# Patient Record
(demographics unavailable — no encounter records)

---

## 2024-10-31 NOTE — ASSESSMENT
[FreeTextEntry1] : DM, type 2. Hyperlipidemia. Obesity. Encounter for weight management. Rheumatoid arthritis. Asthma.  Lab. tests today. Will begin Mounjaro - side effects discussed; device demonstrated. Continue all other tx. F/U - 3 months.

## 2024-10-31 NOTE — HISTORY OF PRESENT ILLNESS
[FreeTextEntry1] : 64 y.o. female, not previously seen by me, with a h/o type 2 DM for about 5 yrs. She is currently managed with metformin, Trulicity and Farxiga. She has lost 20 lb during the last year. Most recent HbA1C was 7.8% on 1/11/23. Glucose today is 104 mg/dl. Last ophthalm. evaluation was 1 month ago - reportedly without retinopathy. She is on statin tx for hyperlipidemia.  8/3/23. The patient's condition is stable. She had a week of steroid tx for an exacerbation of RA about 1 month ago. BSs by HBGMng are usually under 140 mg/dl. HbA1C was 6.5% on 4/13/23. She has lost 5 lb. Glucose today is 113 mg/dl. 2/15/24. The patient discontinued Trulicity several months ago. She has gained 9 lb. HbA1C today is 8% (was 6.5% in 8/2023). She reports no hypoglycemia. 7/24/24. The patient is doing well overall except for exacerbations of asthma. She has lost 4 lb. She had d/c Mounjaro because of short supply about 2 months ago. HbA1C today is 6.9%, glucose - 89 mg/dl. 10/31/24. The patient is doing well on metformin 500 mg once a day but is unable to lose weight. Glucose today is 98 mg/dl.

## 2024-10-31 NOTE — REASON FOR VISIT
show [Follow - Up] : a follow-up visit [DM Type 2] : DM Type 2 [Weight Management/Obesity] : weight management/obesity [Other___] : [unfilled]

## 2024-12-03 NOTE — ASSESSMENT
[FreeTextEntry1] : 66 year old woman presents for follow up of seropositive RA (strongly positive RF and CCP >250) non erosive disease. Patient overall feeling well in terms of joint symptoms, continues Rasuvo 20 mg qweekly, folic acid 1 mg qday, and hydroxychloroquine 200 mg daily. Patient with right first toe pain with hallux rigidus of the right first toe MTP,, discussed hallux rigidus, possible podiatry evaluation, patient defers at this time. Continues Ophthalmology follow up as recommended.  Labs reviewed from 10/31/2024 with endocrinologist. Will continue current regimen, blood pressure elevated today, repeat improved. Will follow-up in 3 months or sooner as needed.

## 2024-12-03 NOTE — HISTORY OF PRESENT ILLNESS
[FreeTextEntry1] : December 3, 2024 Patient overall feeling well Feels some pain in the right first toe, no swelling but with decrease in movement No podiatry follow up for diabetes, A1c 6.5 9/2024 ophthalmology Plaquenil 200 mg daily Rasuvo 20 mg weekly on Thursday Folic acid 1 mg qday Will have Pneumonia vaccine next week Will have shingles at pharmacy Completed flu vaccine Recent labs reviewed  August 7, 2024 Patient returns for follow up visit of RA Patient overall feeling well, blood pressure elevated today Elevated blood pressure feels likely due to stress from recent travel, due to weather flight from [place] was canceled and drove from [place] to [place] returning yesterday. Patient overall feeling well in terms of joint symptoms Reviewed recent labs from endocrinologist on July 24 Patient continues Rasuvo 20 mg weekly, last dose yesterday, in addition to hydroxychloroquine 200 mg daily, as well as folic acid 1 mg daily Patient is up-to-date with ophthalmology follow-up regarding hydroxychloroquine use No joint swelling present No morning stiffness No side effects from medication  May 24, 2024 Patient returns for follow up of RA. Recently placed on Mounjaro for a few months with good response, but currently difficult to refill Taking Hydroxychloroquine 200 mg qday with folic acid and Rasuvo 20 mg qweek.  No active synovitis.  Last followed up with ophthalmologist one month ago.  Patient is overall feeling well, no joint symptoms at this time. Walks often for exercise No joint swelling or stiffness has some chronic shoulder issues, no pain but limitations in certain movements. Was recently evaluated by PMD, reviewed labs on patient's phone portal from April 29, 2024. - 04/29/24 CBC normal, CMP normal, A1c 6.5%, thyroid normal  February 26, 2024 Patient returns for follow up of RA Patient overall doing well in terms of her arthritis symptoms. Reports pain in fingers, predominantly base of the right thumb, small nodule noted, does not interfere with range of motion of the thumb, but is painful. Reports previous nodule in left palm as well previously, resolved with some massaging, no known history of trigger finger in the past. Continues Rasuvo 20 mg qweek on Fridays, hydroxychloroquine 200 mg qday Takes Tylenol as needed for pain, has not needed to recently Patient planning for left TKA, holding off at this time Discussed using thumb splint for possible early trigger finger, referred for hand orthopedics if does not improve Following with Ophthalmology every 3 months, next appointment March 6th Patient works on computer, will try to limit while thumb symptoms persist. A1c elevated at last visit with endocrinologist, discussed increasing walking as tolerated, Mounjaro prescribed as well.  December 12, 2023 Patient returns for follow up of RA Reports some aches and pain in hands Reports previous difficulty bending finger on left middle finger Recently took Celebrex and Tylenol after pain in right shoulder and pain in finger, felt improvement Started taking hydroxychloroquine 200 mg qday again, no side effects noted, feels improvement Patient forgot to to take Rasuvo for about three weeks, has been taking for the past two weeks on Fridays Planning for left TKA, history of TKA on right knee Evaluated by PCP today, prescribed 10 day course of amoxicillin for sinus infection, will hold Rasuvo  Discussed shingles vaccine Discussed following up with ophthalmology for retinal monitoring due to hydroxychloroquine use  September 12, 2023 Follow up of RA Rasuvo 20 mg qweek, takes on Friday No pain at present Back is better, neck is better Patient will have shingles vaccine, and will have flu vaccine from PMD More walking for exercise Patient was unable to tolerate plaquenil, stopped since last visit, felt dizziness No acetmainophen or nsaids for painrecnetly  June 30, 2023 Patient returns for follow up of RA Had flare of back pain, a couple of months ago Was seen in the ER, received an injection for pain Patient had additional flare of neck pain, treated with medrol dose pack with good response Taking rasuvo 20 mg qweek, takes on Fridays No pain at present If pain is bad, takes two tylenol Feeling better, now can walk better Parked a couple of blocks away, to increase walking A1c 6.5% left shoulder with decreased ROM right knee s/p TKR Left knee feeling better, but left hip feels some pain since change in gait after knee surgery    February 2, 2023 Patient feeling well Walking for exercise No joint symptoms at this time Major concern related to back pain, following with spine, will see Dr. Lozoya, pain is limiting some motion, particularly lateral rotation of the spine, now feels more symptomatic and will see Spine Continues rasuvo 20 on Wednesday or Thursday folic acid  1 mg qday as well Left knee with some pain intermittently, follows with orthopedist, eventually may need left knee replacement, would like to address back issues first Feels some limitation in ROM of the shoulders, patient feels secondary to neck issues Ate within one hour, may have elevated glucose Following closely with endocrinology, restarted trulicity, had diarrhea from metformin so stopped  November 3, 2022 Patient returns for follow up Previously followed by Dr. Oliva  Patient currently takes rasuvo 20 mg for the past two months Much improved since starting rasuvo More walking since right knee replacement February 2022 Recommended to have left knee surgery as well No injections for the left knee previously  Blood pressure elevated, not taking losartan at present, repeat blood pressure better controlled No other joint symptoms at this time Reviewed previous lab results with patient. Will repeat today No recent infections  Will have flu vaccine today.  Patient diagnosed in 1993 Treated with monotherapy with MTX, no other DMARDs in past Changed to injectable MTX about tow months ago. Patient concerned about inflammatory markers

## 2024-12-03 NOTE — REVIEW OF SYSTEMS
[Arthralgias] : arthralgias [Negative] : Heme/Lymph [FreeTextEntry9] : right 1st toe with decrease in flexion and extension

## 2024-12-03 NOTE — PHYSICAL EXAM
[General Appearance - Alert] : alert [General Appearance - In No Acute Distress] : in no acute distress [General Appearance - Well-Appearing] : healthy appearing [Sclera] : the sclera and conjunctiva were normal [Respiration, Rhythm And Depth] : normal respiratory rhythm and effort [Exaggerated Use Of Accessory Muscles For Inspiration] : no accessory muscle use [Auscultation Breath Sounds / Voice Sounds] : lungs were clear to auscultation bilaterally [Abnormal Walk] : normal gait [Musculoskeletal - Swelling] : no joint swelling seen [] : no rash [Oriented To Time, Place, And Person] : oriented to person, place, and time [Impaired Insight] : insight and judgment were intact [Affect] : the affect was normal [Mood] : the mood was normal [Examination Of The Oral Cavity] : the lips and gums were normal [Heart Rate And Rhythm] : heart rate was normal and rhythm regular [Heart Sounds] : normal S1 and S2 [Murmurs] : no murmurs [FreeTextEntry1] : No active synovitis of the upper and lower extremities bilaterally. decrease flexion nd extension of the right 1st MTP

## 2025-03-04 NOTE — PHYSICAL EXAM
[General Appearance - Alert] : alert [General Appearance - In No Acute Distress] : in no acute distress [General Appearance - Well-Appearing] : healthy appearing [Sclera] : the sclera and conjunctiva were normal [Examination Of The Oral Cavity] : the lips and gums were normal [Respiration, Rhythm And Depth] : normal respiratory rhythm and effort [Exaggerated Use Of Accessory Muscles For Inspiration] : no accessory muscle use [Auscultation Breath Sounds / Voice Sounds] : lungs were clear to auscultation bilaterally [Heart Rate And Rhythm] : heart rate was normal and rhythm regular [Heart Sounds] : normal S1 and S2 [Edema] : there was no peripheral edema [No Spinal Tenderness] : no spinal tenderness [Abnormal Walk] : normal gait [Musculoskeletal - Swelling] : no joint swelling seen [] : no rash [Oriented To Time, Place, And Person] : oriented to person, place, and time [Impaired Insight] : insight and judgment were intact [Affect] : the affect was normal [Mood] : the mood was normal [FreeTextEntry1] : No active synovitis of the upper and lower extremities bilaterally. decrease flexion and extension of the right 1st MTP.  Decreased internal and external rotation of the left shoulders, good abduction and adduction of the bilateral shoulders. Right knee s/p TKR, crepitus of the left knee with decrease in full extension, no tenderness, no effusion.

## 2025-03-04 NOTE — HISTORY OF PRESENT ILLNESS
[FreeTextEntry1] : March 4, 2025 Patient returns for follow up of RA Patient overall feeling well Continues Plaquenil 200 mg daily and Rasuvo 20 mg weekly on Thursday Folic acid 1 mg qday Had small flare up with left shoulder pain, after holding medication during infection and taking antibiotics, held for 3 weeks as also forgot the dose, now better since restarting  Plaquenil 200 mg qday Ophthalmology follow up 9/2024 Had appointment with PMD earlier today, will forward lab results to office No side effects to date 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  December 3, 2024 Patient overall feeling well Feels some pain in the right first toe, no swelling but with decrease in movement No podiatry follow up for diabetes, A1c 6.5 9/2024 ophthalmology Plaquenil 200 mg daily Rasuvo 20 mg weekly on Thursday Folic acid 1 mg qday Will have Pneumonia vaccine next week Will have shingles at pharmacy Completed flu vaccine Recent labs reviewed  August 7, 2024 Patient returns for follow up visit of RA Patient overall feeling well, blood pressure elevated today Elevated blood pressure feels likely due to stress from recent travel, due to weather flight from [place] was canceled and drove from [place] to [place] returning yesterday. Patient overall feeling well in terms of joint symptoms Reviewed recent labs from endocrinologist on July 24 Patient continues Rasuvo 20 mg weekly, last dose yesterday, in addition to hydroxychloroquine 200 mg daily, as well as folic acid 1 mg daily Patient is up-to-date with ophthalmology follow-up regarding hydroxychloroquine use No joint swelling present No morning stiffness No side effects from medication  May 24, 2024 Patient returns for follow up of RA. Recently placed on Mounjaro for a few months with good response, but currently difficult to refill Taking Hydroxychloroquine 200 mg qday with folic acid and Rasuvo 20 mg qweek.  No active synovitis.  Last followed up with ophthalmologist one month ago.  Patient is overall feeling well, no joint symptoms at this time. Walks often for exercise No joint swelling or stiffness has some chronic shoulder issues, no pain but limitations in certain movements. Was recently evaluated by PMD, reviewed labs on patient's phone portal from April 29, 2024. - 04/29/24 CBC normal, CMP normal, A1c 6.5%, thyroid normal  February 26, 2024 Patient returns for follow up of RA Patient overall doing well in terms of her arthritis symptoms. Reports pain in fingers, predominantly base of the right thumb, small nodule noted, does not interfere with range of motion of the thumb, but is painful. Reports previous nodule in left palm as well previously, resolved with some massaging, no known history of trigger finger in the past. Continues Rasuvo 20 mg qweek on Fridays, hydroxychloroquine 200 mg qday Takes Tylenol as needed for pain, has not needed to recently Patient planning for left TKA, holding off at this time Discussed using thumb splint for possible early trigger finger, referred for hand orthopedics if does not improve Following with Ophthalmology every 3 months, next appointment March 6th Patient works on computer, will try to limit while thumb symptoms persist. A1c elevated at last visit with endocrinologist, discussed increasing walking as tolerated, Mounjaro prescribed as well.  December 12, 2023 Patient returns for follow up of RA Reports some aches and pain in hands Reports previous difficulty bending finger on left middle finger Recently took Celebrex and Tylenol after pain in right shoulder and pain in finger, felt improvement Started taking hydroxychloroquine 200 mg qday again, no side effects noted, feels improvement Patient forgot to to take Rasuvo for about three weeks, has been taking for the past two weeks on Fridays Planning for left TKA, history of TKA on right knee Evaluated by PCP today, prescribed 10 day course of amoxicillin for sinus infection, will hold Rasuvo  Discussed shingles vaccine Discussed following up with ophthalmology for retinal monitoring due to hydroxychloroquine use  September 12, 2023 Follow up of RA Rasuvo 20 mg qweek, takes on Friday No pain at present Back is better, neck is better Patient will have shingles vaccine, and will have flu vaccine from PMD More walking for exercise Patient was unable to tolerate plaquenil, stopped since last visit, felt dizziness No acetmainophen or nsaids for painrecnetly  June 30, 2023 Patient returns for follow up of RA Had flare of back pain, a couple of months ago Was seen in the ER, received an injection for pain Patient had additional flare of neck pain, treated with medrol dose pack with good response Taking rasuvo 20 mg qweek, takes on Fridays No pain at present If pain is bad, takes two tylenol Feeling better, now can walk better Parked a couple of blocks away, to increase walking A1c 6.5% left shoulder with decreased ROM right knee s/p TKR Left knee feeling better, but left hip feels some pain since change in gait after knee surgery    February 2, 2023 Patient feeling well Walking for exercise No joint symptoms at this time Major concern related to back pain, following with spine, will see Dr. Lozoya, pain is limiting some motion, particularly lateral rotation of the spine, now feels more symptomatic and will see Spine Continues rasuvo 20 on Wednesday or Thursday folic acid  1 mg qday as well Left knee with some pain intermittently, follows with orthopedist, eventually may need left knee replacement, would like to address back issues first Feels some limitation in ROM of the shoulders, patient feels secondary to neck issues Ate within one hour, may have elevated glucose Following closely with endocrinology, restarted trulicity, had diarrhea from metformin so stopped  November 3, 2022 Patient returns for follow up Previously followed by Dr. Oliva  Patient currently takes rasuvo 20 mg for the past two months Much improved since starting rasuvo More walking since right knee replacement February 2022 Recommended to have left knee surgery as well No injections for the left knee previously  Blood pressure elevated, not taking losartan at present, repeat blood pressure better controlled No other joint symptoms at this time Reviewed previous lab results with patient. Will repeat today No recent infections  Will have flu vaccine today.  Patient diagnosed in 1993 Treated with monotherapy with MTX, no other DMARDs in past Changed to injectable MTX about tow months ago. Patient concerned about inflammatory markers

## 2025-03-04 NOTE — ASSESSMENT
[FreeTextEntry1] : 66 year old woman presents for follow up of seropositive RA (strongly positive RF and CCP >250) non erosive disease. Patient overall feeling well in terms of joint symptoms, continues Rasuvo 20 mg qweekly, folic acid 1 mg qday, and hydroxychloroquine 200 mg daily. Continues Ophthalmology follow up as recommended, currently up to date.  Had follow up with PMD earlier today, had labs completed, will fax results to office for review.  Patient following with outside orthopedist for left knee DJD (s/p right TKR in past) for possible left knee replacement, recent surgery postponed as was feeling better in terms of pain and mobility, will follow up with ortho as recommended. Will continue current regimen, blood pressure well controlled today. Will follow-up in 3 months or sooner as needed.

## 2025-04-24 NOTE — ASSESSMENT
[FreeTextEntry1] : Type 2 DM Obesity Hyperlipidemia Rheumatoid arthritis Asthma  Will continue current tx Medications refilled F/U - 6 months

## 2025-04-24 NOTE — HISTORY OF PRESENT ILLNESS
[FreeTextEntry1] : 64 y.o. female, not previously seen by me, with a h/o type 2 DM for about 5 yrs. She is currently managed with metformin, Trulicity and Farxiga. She has lost 20 lb during the last year. Most recent HbA1C was 7.8% on 1/11/23. Glucose today is 104 mg/dl. Last ophthalm. evaluation was 1 month ago - reportedly without retinopathy. She is on statin tx for hyperlipidemia.  8/3/23. The patient's condition is stable. She had a week of steroid tx for an exacerbation of RA about 1 month ago. BSs by HBGMng are usually under 140 mg/dl. HbA1C was 6.5% on 4/13/23. She has lost 5 lb. Glucose today is 113 mg/dl. 2/15/24. The patient discontinued Trulicity several months ago. She has gained 9 lb. HbA1C today is 8% (was 6.5% in 8/2023). She reports no hypoglycemia. 7/24/24. The patient is doing well overall except for exacerbations of asthma. She has lost 4 lb. She had d/c Mounjaro because of short supply about 2 months ago. HbA1C today is 6.9%, glucose - 89 mg/dl. 10/31/24. The patient is doing well on metformin 500 mg once a day but is unable to lose weight. Glucose today is 98 mg/dl. 4/24/25. The patient is doing well. She has lost 10 lb using lifestyle measures. She continues on metformin 500 mg/day. HbA1C today is 6.4%, glucose - 140 mg/dl.

## 2025-04-24 NOTE — REASON FOR VISIT
[Follow - Up] : a follow-up visit [DM Type 2] : DM Type 2 [Menopause Symptoms] : menopause symptoms [Weight Management/Obesity] : weight management/obesity [Other___] : [unfilled]

## 2025-06-03 NOTE — HISTORY OF PRESENT ILLNESS
[FreeTextEntry1] : Beverley 3, 2025 Patient returns for follow up of RA Feeling well Started juice with beet root, green apple, lemon juice, water Feels lowers cravings and helped to lose 11 pounds Also taking tumeric, lemon, ginger feels helping the low back and joint symptoms Feels helping the left knee, feels improvement in left knee extension and gait Walking more than have walked in years as knee feels better Rasuvo 20 mg qweek with folic acid  Plaquenil 200 mg qday Went to doctor earlier today, had blood tests complete and will forward to office for review Ophthalmology 9/2024, follow up in 9/2025 March 4, 2025 Patient returns for follow up of RA Patient overall feeling well Continues Plaquenil 200 mg daily and Rasuvo 20 mg weekly on Thursday Folic acid 1 mg qday Had small flare up with left shoulder pain, after holding medication during infection and taking antibiotics, held for 3 weeks as also forgot the dose, now better since restarting  Plaquenil 200 mg qday Ophthalmology follow up 9/2024 Had appointment with PMD earlier today, will forward lab results to office No side effects to date 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  December 3, 2024 Patient overall feeling well Feels some pain in the right first toe, no swelling but with decrease in movement No podiatry follow up for diabetes, A1c 6.5 9/2024 ophthalmology Plaquenil 200 mg daily Rasuvo 20 mg weekly on Thursday Folic acid 1 mg qday Will have Pneumonia vaccine next week Will have shingles at pharmacy Completed flu vaccine Recent labs reviewed  August 7, 2024 Patient returns for follow up visit of RA Patient overall feeling well, blood pressure elevated today Elevated blood pressure feels likely due to stress from recent travel, due to weather flight from [place] was canceled and drove from [place] to [place] returning yesterday. Patient overall feeling well in terms of joint symptoms Reviewed recent labs from endocrinologist on July 24 Patient continues Rasuvo 20 mg weekly, last dose yesterday, in addition to hydroxychloroquine 200 mg daily, as well as folic acid 1 mg daily Patient is up-to-date with ophthalmology follow-up regarding hydroxychloroquine use No joint swelling present No morning stiffness No side effects from medication  May 24, 2024 Patient returns for follow up of RA. Recently placed on Mounjaro for a few months with good response, but currently difficult to refill Taking Hydroxychloroquine 200 mg qday with folic acid and Rasuvo 20 mg qweek.  No active synovitis.  Last followed up with ophthalmologist one month ago.  Patient is overall feeling well, no joint symptoms at this time. Walks often for exercise No joint swelling or stiffness has some chronic shoulder issues, no pain but limitations in certain movements. Was recently evaluated by PMD, reviewed labs on patient's phone portal from April 29, 2024. - 04/29/24 CBC normal, CMP normal, A1c 6.5%, thyroid normal  February 26, 2024 Patient returns for follow up of RA Patient overall doing well in terms of her arthritis symptoms. Reports pain in fingers, predominantly base of the right thumb, small nodule noted, does not interfere with range of motion of the thumb, but is painful. Reports previous nodule in left palm as well previously, resolved with some massaging, no known history of trigger finger in the past. Continues Rasuvo 20 mg qweek on Fridays, hydroxychloroquine 200 mg qday Takes Tylenol as needed for pain, has not needed to recently Patient planning for left TKA, holding off at this time Discussed using thumb splint for possible early trigger finger, referred for hand orthopedics if does not improve Following with Ophthalmology every 3 months, next appointment March 6th Patient works on computer, will try to limit while thumb symptoms persist. A1c elevated at last visit with endocrinologist, discussed increasing walking as tolerated, Mounjaro prescribed as well.  December 12, 2023 Patient returns for follow up of RA Reports some aches and pain in hands Reports previous difficulty bending finger on left middle finger Recently took Celebrex and Tylenol after pain in right shoulder and pain in finger, felt improvement Started taking hydroxychloroquine 200 mg qday again, no side effects noted, feels improvement Patient forgot to to take Rasuvo for about three weeks, has been taking for the past two weeks on Fridays Planning for left TKA, history of TKA on right knee Evaluated by PCP today, prescribed 10 day course of amoxicillin for sinus infection, will hold Rasuvo  Discussed shingles vaccine Discussed following up with ophthalmology for retinal monitoring due to hydroxychloroquine use  September 12, 2023 Follow up of RA Rasuvo 20 mg qweek, takes on Friday No pain at present Back is better, neck is better Patient will have shingles vaccine, and will have flu vaccine from PMD More walking for exercise Patient was unable to tolerate plaquenil, stopped since last visit, felt dizziness No acetmainophen or nsaids for painrecnetly  June 30, 2023 Patient returns for follow up of RA Had flare of back pain, a couple of months ago Was seen in the ER, received an injection for pain Patient had additional flare of neck pain, treated with medrol dose pack with good response Taking rasuvo 20 mg qweek, takes on Fridays No pain at present If pain is bad, takes two tylenol Feeling better, now can walk better Parked a couple of blocks away, to increase walking A1c 6.5% left shoulder with decreased ROM right knee s/p TKR Left knee feeling better, but left hip feels some pain since change in gait after knee surgery    February 2, 2023 Patient feeling well Walking for exercise No joint symptoms at this time Major concern related to back pain, following with spine, will see Dr. Lozoya, pain is limiting some motion, particularly lateral rotation of the spine, now feels more symptomatic and will see Spine Continues rasuvo 20 on Wednesday or Thursday folic acid  1 mg qday as well Left knee with some pain intermittently, follows with orthopedist, eventually may need left knee replacement, would like to address back issues first Feels some limitation in ROM of the shoulders, patient feels secondary to neck issues Ate within one hour, may have elevated glucose Following closely with endocrinology, restarted trulicity, had diarrhea from metformin so stopped  November 3, 2022 Patient returns for follow up Previously followed by Dr. Oliva  Patient currently takes rasuvo 20 mg for the past two months Much improved since starting rasuvo More walking since right knee replacement February 2022 Recommended to have left knee surgery as well No injections for the left knee previously  Blood pressure elevated, not taking losartan at present, repeat blood pressure better controlled No other joint symptoms at this time Reviewed previous lab results with patient. Will repeat today No recent infections  Will have flu vaccine today.  Patient diagnosed in 1993 Treated with monotherapy with MTX, no other DMARDs in past Changed to injectable MTX about tow months ago. Patient concerned about inflammatory markers

## 2025-06-03 NOTE — ASSESSMENT
[FreeTextEntry1] : 66 year old woman presents for follow up of seropositive RA (strongly positive RF and CCP >250) non erosive disease. Patient overall feeling well in terms of joint symptoms, continues Rasuvo 20 mg qweekly, folic acid 1 mg qday, and hydroxychloroquine 200 mg daily. Ophthalmology follow up as recommended, currently up to date, next visit in 9/2025.  Had follow up with PMD earlier today, had labs completed, will fax results to office for review.  Patient following with outside orthopedist for left knee DJD (s/p right TKR in past) for possible left knee replacement, however since weight loss and addition of tumeric supplement, patient overall feelng better and will likely delay subsequent left knee surgery as feeling better in terms of pain and mobility, will follow up with ortho as recommended. Will continue current regimen, blood pressure well controlled today. Will follow-up in 3 months or sooner as needed.

## 2025-06-03 NOTE — PHYSICAL EXAM
[General Appearance - Alert] : alert [General Appearance - In No Acute Distress] : in no acute distress [General Appearance - Well-Appearing] : healthy appearing [Sclera] : the sclera and conjunctiva were normal [Examination Of The Oral Cavity] : the lips and gums were normal [Respiration, Rhythm And Depth] : normal respiratory rhythm and effort [Exaggerated Use Of Accessory Muscles For Inspiration] : no accessory muscle use [Auscultation Breath Sounds / Voice Sounds] : lungs were clear to auscultation bilaterally [Edema] : there was no peripheral edema [No Spinal Tenderness] : no spinal tenderness [Abnormal Walk] : normal gait [Musculoskeletal - Swelling] : no joint swelling seen [Motor Tone] : muscle strength and tone were normal [] : no rash [Oriented To Time, Place, And Person] : oriented to person, place, and time [Impaired Insight] : insight and judgment were intact [Affect] : the affect was normal [Mood] : the mood was normal [FreeTextEntry1] : No active synovitis of the upper and lower extremities bilaterally. decrease flexion and extension of the right 1st MTP.  Decreased internal and external rotation of the left shoulder, good abduction and adduction of the bilateral shoulders. Right knee s/p TKR, crepitus of the left knee with decrease in full extension (improved from previous visit) no tenderness, no effusion.